# Patient Record
Sex: MALE | Race: WHITE | NOT HISPANIC OR LATINO | URBAN - METROPOLITAN AREA
[De-identification: names, ages, dates, MRNs, and addresses within clinical notes are randomized per-mention and may not be internally consistent; named-entity substitution may affect disease eponyms.]

---

## 2017-04-18 ENCOUNTER — APPOINTMENT (OUTPATIENT)
Age: 27
Setting detail: DERMATOLOGY
End: 2017-04-23

## 2017-04-18 DIAGNOSIS — B35.3 TINEA PEDIS: ICD-10-CM

## 2017-04-18 DIAGNOSIS — L30.9 DERMATITIS, UNSPECIFIED: ICD-10-CM

## 2017-04-18 DIAGNOSIS — B35.1 TINEA UNGUIUM: ICD-10-CM

## 2017-04-18 PROCEDURE — OTHER PRESCRIPTION: OTHER

## 2017-04-18 PROCEDURE — OTHER TREATMENT REGIMEN: OTHER

## 2017-04-18 PROCEDURE — OTHER OTHER: OTHER

## 2017-04-18 PROCEDURE — 99203 OFFICE O/P NEW LOW 30 MIN: CPT

## 2017-04-18 PROCEDURE — OTHER ORDER TESTS: OTHER

## 2017-04-18 PROCEDURE — OTHER COUNSELING: OTHER

## 2017-04-18 RX ORDER — KETOCONAZOLE 20 MG/G
CREAM TOPICAL BID
Qty: 1 | Refills: 4 | Status: ERX | COMMUNITY
Start: 2017-04-18

## 2017-04-18 RX ORDER — TRIAMCINOLONE ACETONIDE 1 MG/G
CREAM TOPICAL BID
Qty: 1 | Refills: 1 | Status: ERX | COMMUNITY
Start: 2017-04-18

## 2017-04-18 ASSESSMENT — LOCATION SIMPLE DESCRIPTION DERM
LOCATION SIMPLE: LEFT PLANTAR SURFACE
LOCATION SIMPLE: RIGHT UPPER ARM
LOCATION SIMPLE: LEFT THIGH
LOCATION SIMPLE: RIGHT HAND
LOCATION SIMPLE: LEFT UPPER ARM
LOCATION SIMPLE: RIGHT PLANTAR SURFACE
LOCATION SIMPLE: LEFT HAND
LOCATION SIMPLE: RIGHT THIGH

## 2017-04-18 ASSESSMENT — LOCATION DETAILED DESCRIPTION DERM
LOCATION DETAILED: LEFT ANTERIOR PROXIMAL THIGH
LOCATION DETAILED: LEFT RADIAL PALM
LOCATION DETAILED: RIGHT PLANTAR FOREFOOT OVERLYING 2ND METATARSAL
LOCATION DETAILED: LEFT PLANTAR FOREFOOT OVERLYING 2ND METATARSAL
LOCATION DETAILED: RIGHT ANTERIOR DISTAL UPPER ARM
LOCATION DETAILED: LEFT ANTERIOR PROXIMAL UPPER ARM
LOCATION DETAILED: RIGHT THENAR EMINENCE
LOCATION DETAILED: RIGHT ANTERIOR PROXIMAL THIGH

## 2017-04-18 ASSESSMENT — LOCATION ZONE DERM
LOCATION ZONE: ARM
LOCATION ZONE: FEET
LOCATION ZONE: LEG
LOCATION ZONE: HAND

## 2017-04-18 NOTE — PROCEDURE: OTHER
Note Text (......Xxx Chief Complaint.): This diagnosis correlates with the
Other (Free Text): PATIENT WITH SEVERE TINEA PEDIS, ONYCHOMYCOSIS AND ALMOST CANNOT WALK COMFORTABLY.\\n\\nHE STATES THAT HE WORKS OUTDOORS AT [employer] AND THAT HIS FEET SWEAT PROFUSELY AND CAUSE HIM TO DEVELOP AN INTRACTABLE TINEA PEDIS.\\n\\nHE HAS BEEN ON CREAMS FOR EXTENDED PERIODS OF TIME -HOWEVER NEVER HAS TAKEN ORAL MEDICINES.\\n\\nADVISED HIM THAT WE NEED TO CHECK HIS LABS SO WE CAN CONSIDER ORAL TERBINAFINE FOR HIS NAILS.\\n \\nUNTIL LABS ARE OBTAINED HE IS TO SOAK HIS FEET DAILY IN VINEGAR 1:4 DILUTION.  THEN APPLY LIBERALLY KETOCONAZOLE \\n\\nADVISED THE PATIENT TO WEAR THICKER MORE ABSORBENT SOCKS AND THEN TO CHANGE THEM IN THE MIDDLE OF THE DAY IF HE HAS AN ISSUE WITH SWEATING.  DISCUSSED PROPER HYGIENE WITH HIM.\\n\\nONCE THE TINEA IS TREATED WE CAN CONSIDER DRYSOL TO HELP WITH THE HYPERHIDROSIS.
Detail Level: Detailed
Other (Free Text): PATIENT DEVELOPED RASH AND IT HAS BEEN MIGRATING TOWARDS HIS TRUNK FROM HIS EXTREMITIES (WHICH IS USUALLY THE OPPOSITE COURSE FOR A MORBILLIFORM DRUG ERUPTION) -- HOWEVER HE HAS NO MEDICATION CHANGES, NO NEW OTC SUBSTANCES, NO NEW FOODS TRIED.  PATIENT STATES THAT HE MAY HAVE HAD A VIRAL URI A FEW WEEKS AGO -- AND THIS COULD REPRESENT A VIRAL EXANTHEM.  NO TREATMENT AS HE HAS NO PRURITUS.  NO SIGNS OF RESPIRATORY INVOLVEMENT.  \\nPICS TAKEN TODAY FOR MONITORING.  PATIENT TO CALL US AND F/U PRN ANY WORSENING OR CHANGES.

## 2017-05-03 ENCOUNTER — RX ONLY (RX ONLY)
Age: 27
End: 2017-05-03

## 2017-05-03 RX ORDER — TERBINAFINE HYDROCHLORIDE 250 MG/1
TABLET ORAL
Qty: 30 | Refills: 2 | Status: ERX | COMMUNITY
Start: 2017-05-03

## 2017-05-16 ENCOUNTER — APPOINTMENT (OUTPATIENT)
Age: 27
Setting detail: DERMATOLOGY
End: 2017-05-20

## 2017-05-16 DIAGNOSIS — L84 CORNS AND CALLOSITIES: ICD-10-CM

## 2017-05-16 DIAGNOSIS — L98.9 DISORDER OF THE SKIN AND SUBCUTANEOUS TISSUE, UNSPECIFIED: ICD-10-CM

## 2017-05-16 DIAGNOSIS — B35.3 TINEA PEDIS: ICD-10-CM

## 2017-05-16 PROCEDURE — OTHER COUNSELING: OTHER

## 2017-05-16 PROCEDURE — OTHER TREATMENT REGIMEN: OTHER

## 2017-05-16 PROCEDURE — 99213 OFFICE O/P EST LOW 20 MIN: CPT

## 2017-05-16 PROCEDURE — OTHER OTHER: OTHER

## 2017-05-16 ASSESSMENT — LOCATION SIMPLE DESCRIPTION DERM
LOCATION SIMPLE: LEFT PLANTAR SURFACE
LOCATION SIMPLE: RIGHT PLANTAR SURFACE

## 2017-05-16 ASSESSMENT — LOCATION ZONE DERM: LOCATION ZONE: FEET

## 2017-05-16 ASSESSMENT — LOCATION DETAILED DESCRIPTION DERM
LOCATION DETAILED: LEFT PLANTAR FOREFOOT OVERLYING 2ND METATARSAL
LOCATION DETAILED: LEFT PLANTAR FOREFOOT OVERLYING 3RD METATARSAL
LOCATION DETAILED: RIGHT PLANTAR FOREFOOT OVERLYING 2ND METATARSAL
LOCATION DETAILED: RIGHT MEDIAL PLANTAR HEEL
LOCATION DETAILED: LEFT MEDIAL PLANTAR HEEL

## 2017-05-16 NOTE — PROCEDURE: OTHER
Detail Level: Detailed
Note Text (......Xxx Chief Complaint.): This diagnosis correlates with the
Other (Free Text): PATIENT REPORTS THAT HIS SYMPTOMS HAVE ABATED.  NO NEW ISSUE
Other (Free Text): PATIENT WITH COMPLETE RESOLUTION AFTER USING THE KETOCONAZOLE CREAM BID X 5 DAYS.  HE HAS CHANGED HIS SHOES, WEARS THICK SOCKS NOW, AND HAS DECIDED NOT TO TAKE ANY ORAL ANTIFUNGALS SINCE HE HAS IMPROVED SO RAPIDLY.